# Patient Record
Sex: FEMALE | Race: AMERICAN INDIAN OR ALASKA NATIVE
[De-identification: names, ages, dates, MRNs, and addresses within clinical notes are randomized per-mention and may not be internally consistent; named-entity substitution may affect disease eponyms.]

---

## 2018-07-14 ENCOUNTER — HOSPITAL ENCOUNTER (OUTPATIENT)
Dept: HOSPITAL 5 - TRG | Age: 23
Discharge: HOME | End: 2018-07-14
Attending: OBSTETRICS & GYNECOLOGY
Payer: MEDICAID

## 2018-07-14 VITALS — DIASTOLIC BLOOD PRESSURE: 64 MMHG | SYSTOLIC BLOOD PRESSURE: 114 MMHG

## 2018-07-14 DIAGNOSIS — Z3A.35: ICD-10-CM

## 2018-07-14 DIAGNOSIS — O47.03: Primary | ICD-10-CM

## 2018-07-14 DIAGNOSIS — Z79.899: ICD-10-CM

## 2018-07-14 LAB
BENZODIAZEPINES SCREEN,URINE: (no result)
BILIRUB UR QL STRIP: (no result)
BLOOD UR QL VISUAL: (no result)
METHADONE SCREEN,URINE: (no result)
OPIATE SCREEN,URINE: (no result)
PH UR STRIP: 7 [PH] (ref 5–7)
PROT UR STRIP-MCNC: (no result) MG/DL
RBC #/AREA URNS HPF: 1 /HPF (ref 0–6)
UROBILINOGEN UR-MCNC: < 2 MG/DL (ref ?–2)
WBC #/AREA URNS HPF: 3 /HPF (ref 0–6)

## 2018-07-14 PROCEDURE — 59025 FETAL NON-STRESS TEST: CPT

## 2018-07-14 PROCEDURE — 80307 DRUG TEST PRSMV CHEM ANLYZR: CPT

## 2018-07-14 PROCEDURE — 76805 OB US >/= 14 WKS SNGL FETUS: CPT

## 2018-07-14 PROCEDURE — 81001 URINALYSIS AUTO W/SCOPE: CPT

## 2018-07-14 NOTE — ULTRASOUND REPORT
FINAL REPORT



EXAM:  US OB &gt; = 14 WEEKS FETUS



HISTORY:  limited PNC/leaking fluid 



TECHNIQUE:  Ultrasound obstetrical transabdominal 



PRIORS:  None.



FINDINGS:  

Single live intrauterine gestation present in cephalic

presentation 



The placenta is grade 2 and anterior 



Amniotic fluid index is 12.4 centimeters within normal limits.

Deepest pocket measurement 5.13 centimeters 



Fetal biometric measurements were obtained 



Biparietal diameter 34 weeks 1 day 



Head circumference 35 weeks 4 days 



Abdominal circumference 34 weeks 0 days 



Femur length 33 weeks 6 days 



Based on today's exam estimated gestational age 34 weeks 2 days

with estimated date of delivery August 23, 2018 



Estimated fetal weight today is 2363 grams 



Following structures seen appear grossly unremarkable, stomach,

kidneys, urinary bladder, diaphragm, four-chamber view of the

heart, three-vessel cord and cord insert, lateral ventricles and

choroid plexus 



Visualized portions of the spine are unremarkable. 



IMPRESSION:  

Single live intrauterine gestation estimated at 34 weeks 2 days 



Based on today's exam estimated date of delivery August 23, 2018 



Amniotic fluid index within normal limits 12.4 centimeters

## 2018-08-01 ENCOUNTER — HOSPITAL ENCOUNTER (OUTPATIENT)
Dept: HOSPITAL 5 - TRG | Age: 23
Discharge: HOME | End: 2018-08-01
Attending: OBSTETRICS & GYNECOLOGY
Payer: MEDICAID

## 2018-08-01 VITALS — DIASTOLIC BLOOD PRESSURE: 67 MMHG | SYSTOLIC BLOOD PRESSURE: 119 MMHG

## 2018-08-01 DIAGNOSIS — O47.03: Primary | ICD-10-CM

## 2018-08-01 DIAGNOSIS — Z3A.36: ICD-10-CM

## 2018-08-01 PROCEDURE — 59025 FETAL NON-STRESS TEST: CPT

## 2018-08-06 ENCOUNTER — HOSPITAL ENCOUNTER (INPATIENT)
Dept: HOSPITAL 5 - TRG | Age: 23
LOS: 2 days | Discharge: HOME | End: 2018-08-08
Attending: OBSTETRICS & GYNECOLOGY | Admitting: OBSTETRICS & GYNECOLOGY
Payer: MEDICAID

## 2018-08-06 DIAGNOSIS — Z3A.37: ICD-10-CM

## 2018-08-06 LAB
BASOPHILS # (AUTO): 0 K/MM3 (ref 0–0.1)
BASOPHILS NFR BLD AUTO: 0.2 % (ref 0–1.8)
BILIRUB UR QL STRIP: (no result)
BLOOD UR QL VISUAL: (no result)
EOSINOPHIL # BLD AUTO: 0.1 K/MM3 (ref 0–0.4)
EOSINOPHIL NFR BLD AUTO: 0.8 % (ref 0–4.3)
HCT VFR BLD CALC: 31.2 % (ref 30.3–42.9)
HGB BLD-MCNC: 10.2 GM/DL (ref 10.1–14.3)
LYMPHOCYTES # BLD AUTO: 1.4 K/MM3 (ref 1.2–5.4)
LYMPHOCYTES NFR BLD AUTO: 19.2 % (ref 13.4–35)
MCH RBC QN AUTO: 29 PG (ref 28–32)
MCHC RBC AUTO-ENTMCNC: 33 % (ref 30–34)
MCV RBC AUTO: 88 FL (ref 79–97)
MONOCYTES # (AUTO): 0.8 K/MM3 (ref 0–0.8)
MONOCYTES % (AUTO): 10.6 % (ref 0–7.3)
PH UR STRIP: 7 [PH] (ref 5–7)
PLATELET # BLD: 147 K/MM3 (ref 140–440)
PROT UR STRIP-MCNC: (no result) MG/DL
RBC # BLD AUTO: 3.55 M/MM3 (ref 3.65–5.03)
RBC #/AREA URNS HPF: 1 /HPF (ref 0–6)
UROBILINOGEN UR-MCNC: < 2 MG/DL (ref ?–2)
WBC #/AREA URNS HPF: 4 /HPF (ref 0–6)

## 2018-08-06 PROCEDURE — 86901 BLOOD TYPING SEROLOGIC RH(D): CPT

## 2018-08-06 PROCEDURE — 86900 BLOOD TYPING SEROLOGIC ABO: CPT

## 2018-08-06 PROCEDURE — 85018 HEMOGLOBIN: CPT

## 2018-08-06 PROCEDURE — 86850 RBC ANTIBODY SCREEN: CPT

## 2018-08-06 PROCEDURE — G0463 HOSPITAL OUTPT CLINIC VISIT: HCPCS

## 2018-08-06 PROCEDURE — 85014 HEMATOCRIT: CPT

## 2018-08-06 PROCEDURE — 36415 COLL VENOUS BLD VENIPUNCTURE: CPT

## 2018-08-06 PROCEDURE — 86762 RUBELLA ANTIBODY: CPT

## 2018-08-06 PROCEDURE — 85025 COMPLETE CBC W/AUTO DIFF WBC: CPT

## 2018-08-06 PROCEDURE — 00HU33Z INSERTION OF INFUSION DEVICE INTO SPINAL CANAL, PERCUTANEOUS APPROACH: ICD-10-PCS | Performed by: ADVANCED PRACTICE MIDWIFE

## 2018-08-06 PROCEDURE — 3E0R3BZ INTRODUCTION OF ANESTHETIC AGENT INTO SPINAL CANAL, PERCUTANEOUS APPROACH: ICD-10-PCS | Performed by: ADVANCED PRACTICE MIDWIFE

## 2018-08-06 PROCEDURE — 81001 URINALYSIS AUTO W/SCOPE: CPT

## 2018-08-06 PROCEDURE — 99211 OFF/OP EST MAY X REQ PHY/QHP: CPT

## 2018-08-06 RX ADMIN — SODIUM CHLORIDE, SODIUM LACTATE, POTASSIUM CHLORIDE, AND CALCIUM CHLORIDE SCH MLS/HR: .6; .31; .03; .02 INJECTION, SOLUTION INTRAVENOUS at 21:48

## 2018-08-06 RX ADMIN — SODIUM CHLORIDE, SODIUM LACTATE, POTASSIUM CHLORIDE, AND CALCIUM CHLORIDE SCH MLS/HR: .6; .31; .03; .02 INJECTION, SOLUTION INTRAVENOUS at 11:30

## 2018-08-06 RX ADMIN — SODIUM CHLORIDE, SODIUM LACTATE, POTASSIUM CHLORIDE, AND CALCIUM CHLORIDE SCH MLS/HR: .6; .31; .03; .02 INJECTION, SOLUTION INTRAVENOUS at 21:49

## 2018-08-06 RX ADMIN — SODIUM CHLORIDE, SODIUM LACTATE, POTASSIUM CHLORIDE, AND CALCIUM CHLORIDE SCH MLS/HR: .6; .31; .03; .02 INJECTION, SOLUTION INTRAVENOUS at 16:07

## 2018-08-06 NOTE — HISTORY AND PHYSICAL REPORT
History of Present Illness


Date of examination: 18


Date of admission: 


18 06:52





Chief complaint: 





Contractions


History of present illness: 





24yo G 2 P 1 0 0 1 at 37 weeks 2 days here with c/o contractions that started 

at 04:00. She reports positive fetal movements but denies loss of fluid. She is 

a late transfer to [facility] from [place] at 34 weeks 4 days. She has a 

history of genital herpes and was on suppressive therapy. She denies recent 

outbreak or prodromal sxs. GBS is negative.





Past History


Past Medical History: no pertinent history


Past Surgical History: no surgical history


GYN History: herpes


Family/Genetic History: none


Social history: single, full code





- Obstetrical History


Expected Date of Delivery: 18


Actual Gestation: 37 Week(s) 2 Day(s) 


: 2


Para: 1


Hx # Term Pregnancies: 1


Number of  Pregnancies: 0


Spontaneous Abortions: 0


Induced : 0


Number of Living Children: 1


  ** #1


Infant Gender: Female


Birth year:  (2015)


Birthweight: 2.92 kg (6 lbs 7 oz)


Method of Delivery: Vaginal


Gestational age at delivery: 39


Complications: none





Medications and Allergies


 Allergies











Allergy/AdvReac Type Severity Reaction Status Date / Time


 


No Known Allergies Allergy   Verified 18 06:11











 Home Medications











 Medication  Instructions  Recorded  Confirmed  Last Taken  Type


 


No Known Home Medications [No  18 Unknown History





Reported Home Medications]     











Active Meds: 


Active Medications





Lactated Ringer's (Lactated Ringers)  1,000 mls @ 125 mls/hr IV AS DIRECT MILAGRO











Review of Systems


All systems: negative





- Vital Signs


Vital signs: 


 Vital Signs











Pulse BP


 


 96 H  113/61 


 


 18 05:35  18 05:35








 











Temp Pulse Resp BP Pulse Ox


 


 98.1 F   85   16   113/66    


 


 18 08:00  18 08:07  18 08:00  18 08:07   














- Obstetrical


FHR: auscultation normal, category 1


FHR comments: 





baseline 140, moderate variability, + accels, no decels


Uterine Contraction Monitor Mode: External


Cervical Dilatation: 5 (per RN)


Cervical Effacement Percentage: 80 (per RN)


Fetal station: -2 (per RN)


Uterine Contraction Pattern: Regular





Results


Result Diagrams: 


 18 08:00





All other labs normal.








Assessment and Plan





- Patient Problems


(1) 37 weeks gestation of pregnancy


Current Visit: Yes   Status: Acute   





(2) Active labor at term


Current Visit: Yes   Status: Acute   


Plan to address problem: 


Admit to L&D with routine labor orders


Anticipate vaginal delivery

## 2018-08-06 NOTE — EVENT NOTE
Date: 08/06/18





S: Pt resting in bed. Family members at bedside. Pain level 0/10 post-epidural.





O: baseline 135, mod variability, + accels, intermittent early & variable decels


    Ctxs: q2-3mins, palpate moderate


    Oxytocin @ 8mu/min


    AROM @ 21:52; clear fluid, large amount





A: 22yo G 2 P 1 0 0 1 at 37w4d by LMP confirmed by 14w3d US


    Active labor


    Category II FHR


    Pain well controlled





P: Continue labor augmentation with Oxytocin


    Continue routine labor orders


    Anticipate vaginal delivery

## 2018-08-06 NOTE — PROCEDURE NOTE
OB Delivery Note





- Delivery


Date of Delivery: 18 (22:38)


Surgeon: LISSA GAMEZ (KIERA)


Estimated blood loss: 200cc





- Vaginal


Delivery presentation: vertex


Delivery position: OA


Intrapartum events: none


Delivery induction: none


Delivery augmentation: rupture of membranes, pitocin


Delivery monitor: external FHT, external uterine


Route of delivery: 


Delivery placenta: spontaneous (22:46)


Delivery cord: 3 umbilical vessels


Episiotomy: none


Delivery laceration: none


Anesthesia: epidural


Delivery comments: 





 of a vigorous term 5 lbs 11.5oz female at 22:38. Baby placed skin-to-skin 

on maternal abdomen and dried. Umbilical cord double-clamped by KIERA Gamez 

and cut by FOB. Spontaneous delivery of placenta, Cara-side presenting @22:46

; was discarded. Small lochia noted. Fundus F/ML/U-2. Placenta intact. No 

lacerations present. Perineum intact. Mom and baby in stable condition.





- Infant


  ** A


Apgar at 1 minute: 8


Apgar at 5 minutes: 9


Infant Gender: Female (5 lbs 11.5 oz (2594 gm); 18 in)

## 2018-08-06 NOTE — ANESTHESIA CONSULTATION
Anesthesia Consult and Med Hx


Date of service: 08/06/18





- Airway


Anesthetic Teeth Evaluation: Good


ROM Head & Neck: Adequate


Mental/Hyoid Distance: Adequate


Mallampati Class: Class II


Intubation Access Assessment: Probably Good





- Pre-Operative Health Status


ASA Pre-Surgery Classification: ASA2


Proposed Anesthetic Plan: Epidural, Spinal





- Pulmonary


Hx Asthma: No


COPD: No


Hx Pneumonia: No





- Cardiovascular System


Hx Hypertension: No





- Central Nervous System


Hx Seizures: No


Hx Psychiatric Problems: No





- Endocrine


Hx Renal Disease: No


Hx End Stage Renal Disease: No


Hx Hypothyroidism: No


Hx Hyperthyroidism: No





- Hematic


Hx Anemia: No


Hx Sickle Cell Disease: No





- Other Systems


Hx Alcohol Use: No

## 2018-08-07 LAB
HCT VFR BLD CALC: 27.6 % (ref 30.3–42.9)
HGB BLD-MCNC: 9.3 GM/DL (ref 10.1–14.3)

## 2018-08-07 RX ADMIN — IBUPROFEN SCH MG: 600 TABLET, FILM COATED ORAL at 01:42

## 2018-08-07 RX ADMIN — IBUPROFEN SCH MG: 600 TABLET, FILM COATED ORAL at 10:22

## 2018-08-07 NOTE — PROGRESS NOTE
Assessment and Plan





A: PPD#1 s/p  


    Breastfeeding


    Stable





P: Routine PP care


    Anticipate discharge home 24-48hrs





Subjective





- Subjective


Date of service: 18


Principal diagnosis: PPD#1 s/p  


Patient reports: appetite normal, voiding normally, pain well controlled, flatus

, ambulating normally, no bowel movement


: doing well, nursing well





Objective





- Vital Signs


Latest vital signs: 


 Vital Signs











  Temp Pulse Resp BP BP Pulse Ox


 


 18 07:59   83   107/71   100


 


 18 07:58  97.4 F L  80  18  107/71   100


 


 18 06:05  97.5 F L  59 L  18   101/59 


 


 18 04:18    20   


 


 18 01:42    20   


 


 18 01:35  98.0 F  72    110/65 


 


 18 00:14  97.9 F   18   


 


 18 00:07   54 L   104/69  


 


 18 23:52   64   122/59  


 


 18 23:37   65   110/66  


 


 18 23:22   80   113/66  


 


 18 23:07   77   115/58  


 


 18 22:52     107/65  


 


 18 22:37   75   119/66  


 


 18 22:33   95 H     76 L


 


 18 22:31   106 H     99


 


 18 22:26   85     97


 


 18 22:22   90   124/64  


 


 18 22:21   89     100


 


 18 22:16   99 H     100


 


 18 22:11   83     100


 


 18 22:06   80   117/71   100


 


 18 22:04   80   126/70  


 


 18 22:01   67   115/59   100


 


 18 21:59   71   109/58  


 


 18 21:57   81   105/58  


 


 18 21:56   71     99


 


 18 21:55   69   105/58  


 


 18 21:53   75   105/60  


 


 18 21:51   71   106/62   96


 


 18 21:49   77   108/56  


 


 18 21:47   75   108/59  


 


 18 21:46   76   113/57   97


 


 18 21:43   71   110/62  


 


 18 21:41   71   121/62   99


 


 18 21:39   65   110/57  


 


 18 21:37   72   115/64  


 


 18 21:36   67     99


 


 18 21:35   67   115/69  


 


 18 21:33   75   110/64  


 


 18 21:31   65   117/65   99


 


 18 21:29   73   117/68  


 


 18 21:27   80   108/66  


 


 18 21:26   75     98


 


 18 21:25   76   114/66  


 


 18 21:23   85   114/70  


 


 18 21:21   82   116/68   100


 


 18 21:19   86   115/68  


 


 18 21:17   83   121/69  


 


 18 21:16   78     100


 


 18 21:14   90   115/58  


 


 18 21:12   107 H     76 L


 


 18 21:11   104 H     100


 


 18 21:08   68   120/70  


 


 18 21:05   71   115/60  


 


 18 20:39   74   110/61  


 


 18 20:33   72   111/66  


 


 18 20:29   77   112/62  


 


 18 20:23   67   109/61  


 


 18 20:17   78   109/59  


 


 18 20:00  97.8 F   16   


 


 18 17:54   81   116/66  


 


 18 11:05   91 H   116/68  








 Intake and Output











 18





 23:59 07:59 15:59


 


Intake Total 564.733 240 


 


Output Total  1700 


 


Balance 564.733 -1460 


 


Intake:   


 


  .733  


 


    Lactated Ringers 1,000 ml 550.000  





    @ 125 mls/hr IV AS   





    DIRECT MILAGRO Rx#:[id number]   


 


    PITOCin/NS 30 UNIT/500ML 14.733  





    30 units In 500 ml @ 2   





    MILLIUNITS/MIN 2 mls/hr   





    IV AS DIRECT ONE Rx#:   





    [id number]   


 


  Intake, Free Water  240 


 


Output:   


 


  Urine  1700 


 


    Indwelling Catheter  700 


 


    Void  1000 


 


Other:   


 


  Total, Output Amount  400 


 


  # Voids   


 


    Void  1 


 


  Estimated Blood Loss 200  














- Exam


Breasts: Present: normal


Cardiovascular: Present: Regular rate, Normal S1, Normal S2, No murmurs


Lungs: Present: Clear to auscultation, Normal air movement


Abdomen: Present: normal appearance, soft, normal bowel sounds.  Absent: 

distention


Vulva: both: normal


Uterus: Present: firm, fundal height below umbilicus (-1)


Extremities: Present: normal


Deep Tendon Reflex Grade: Normal +2





- Labs


Labs: 


 Abnormal lab results











  18 Range/Units





  08:00 


 


RBC  3.55 L  (3.65-5.03)  M/mm3


 


RDW  15.7 H  (13.2-15.2)  %


 


Mono % (Auto)  10.6 H  (0.0-7.3)  %

## 2018-08-07 NOTE — DISCHARGE SUMMARY
Providers





- Providers


Date of Admission: 


18 06:52





Date of discharge: 18


Attending physician: 


KEON CORBETT MD





Primary care physician: 


KEON CORBETT MD








Hospitalization


Reason for admission: active labor, IUP at term


Delivery: 


Procedure details: 





See H&P and Delivery note


Episiotomy: none


Laceration: none


Other postpartum procedures: none


Postpartum complications: none


Discharge diagnosis: IUP at term delivered


[place] baby: female


Condition at discharge: Good


Disposition: DC-01 TO HOME OR SELFCARE





Plan





- Provider Discharge Summary


Activity: routine, no sex for 6 weeks, no heavy lifting 4 weeks, no strenuous 

exercise


Diet: routine


Instructions: routine


Additional instructions: 


[]  Smoking cessation referral if applicable(refer to patient education folder 

for contact #)


[]  Refer to [facility] Booklet








Call your doctor immediately for:


* Fever > 100.5


* Heavy vaginal bleeding ( >1 pad per hour)


* Severe persistent headache


* Shortness of breath


* Reddened, hot, painful area to leg or breast


* Drainage or odor from incision.





* Keep incision clean and dry at all times and follow doctor's instructions 

regarding bathing/showering











- Follow up plan


Follow up: 


KEON CORBETT MD [Primary Care Provider] - 6 Weeks

## 2018-08-08 VITALS — SYSTOLIC BLOOD PRESSURE: 112 MMHG | DIASTOLIC BLOOD PRESSURE: 66 MMHG

## 2018-08-08 RX ADMIN — IBUPROFEN SCH MG: 600 TABLET, FILM COATED ORAL at 05:56

## 2018-08-08 RX ADMIN — IBUPROFEN SCH: 600 TABLET, FILM COATED ORAL at 00:36
